# Patient Record
Sex: MALE | URBAN - METROPOLITAN AREA
[De-identification: names, ages, dates, MRNs, and addresses within clinical notes are randomized per-mention and may not be internally consistent; named-entity substitution may affect disease eponyms.]

---

## 2022-11-17 ENCOUNTER — OFFICE (OUTPATIENT)
Dept: URBAN - METROPOLITAN AREA CLINIC 76 | Facility: CLINIC | Age: 47
Setting detail: OPHTHALMOLOGY
End: 2022-11-17
Payer: COMMERCIAL

## 2022-11-17 DIAGNOSIS — H01.005: ICD-10-CM

## 2022-11-17 DIAGNOSIS — H01.001: ICD-10-CM

## 2022-11-17 DIAGNOSIS — H01.004: ICD-10-CM

## 2022-11-17 DIAGNOSIS — H01.002: ICD-10-CM

## 2022-11-17 DIAGNOSIS — H52.4: ICD-10-CM

## 2022-11-17 PROBLEM — H11.153 PINGUECULA; BOTH EYES: Status: ACTIVE | Noted: 2022-11-17

## 2022-11-17 PROCEDURE — 92004 COMPRE OPH EXAM NEW PT 1/>: CPT | Performed by: OPHTHALMOLOGY

## 2022-11-17 PROCEDURE — 92015 DETERMINE REFRACTIVE STATE: CPT | Performed by: OPHTHALMOLOGY

## 2022-11-17 ASSESSMENT — CONFRONTATIONAL VISUAL FIELD TEST (CVF)
OS_FINDINGS: FULL
OD_FINDINGS: FULL

## 2022-11-17 ASSESSMENT — VISUAL ACUITY
OD_BCVA: 20/20
OS_BCVA: 20/20

## 2022-11-17 ASSESSMENT — KERATOMETRY
OD_AXISANGLE_DEGREES: 085
OD_K2POWER_DIOPTERS: 43.00
OS_K1POWER_DIOPTERS: 42.25
OS_AXISANGLE_DEGREES: 078
OD_K1POWER_DIOPTERS: 42.50
OS_K2POWER_DIOPTERS: 42.75

## 2022-11-17 ASSESSMENT — LID EXAM ASSESSMENTS
OD_BLEPHARITIS: RLL RUL 1+
OS_BLEPHARITIS: LLL LUL 1+

## 2023-03-21 ASSESSMENT — REFRACTION_AUTOREFRACTION
OS_AXIS: 044
OD_CYLINDER: -0.25
OD_SPHERE: +0.50
OS_CYLINDER: -0.50
OS_SPHERE: +0.50
OD_AXIS: 022

## 2023-03-21 ASSESSMENT — REFRACTION_MANIFEST
OS_CYLINDER: SPH
OD_SPHERE: PLANO
OD_VA2: 20/20(J1+)
OS_VA2: 20/20(J1+)
OD_VA1: 20/20
OD_CYLINDER: SPH
OS_VA1: 20/20
OS_ADD: +1.75
OS_SPHERE: PLANO
OD_ADD: +1.75

## 2023-03-21 ASSESSMENT — KERATOMETRY
OS_K2POWER_DIOPTERS: 42.75
OD_K1POWER_DIOPTERS: 42.50
OD_AXISANGLE_DEGREES: 085
OD_K2POWER_DIOPTERS: 43.00
OS_AXISANGLE_DEGREES: 078
OS_K1POWER_DIOPTERS: 42.25

## 2023-03-21 ASSESSMENT — AXIALLENGTH_DERIVED
OS_AL: 23.8649
OD_AL: 23.722

## 2023-03-21 ASSESSMENT — SPHEQUIV_DERIVED
OS_SPHEQUIV: 0.25
OD_SPHEQUIV: 0.375

## 2023-05-17 ENCOUNTER — EMERGENCY (EMERGENCY)
Facility: HOSPITAL | Age: 48
LOS: 0 days | Discharge: HOME | End: 2023-05-18
Attending: EMERGENCY MEDICINE
Payer: MEDICAID

## 2023-05-17 VITALS
OXYGEN SATURATION: 99 % | DIASTOLIC BLOOD PRESSURE: 75 MMHG | HEART RATE: 75 BPM | RESPIRATION RATE: 18 BRPM | SYSTOLIC BLOOD PRESSURE: 120 MMHG | TEMPERATURE: 98 F

## 2023-05-17 DIAGNOSIS — T21.21XA BURN OF SECOND DEGREE OF CHEST WALL, INITIAL ENCOUNTER: ICD-10-CM
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
## 2023-05-17 DIAGNOSIS — T23.201A BURN OF SECOND DEGREE OF RIGHT HAND, UNSPECIFIED SITE, INITIAL ENCOUNTER: ICD-10-CM

## 2023-05-17 DIAGNOSIS — Z23 ENCOUNTER FOR IMMUNIZATION: ICD-10-CM

## 2023-05-17 DIAGNOSIS — T31.0 BURNS INVOLVING LESS THAN 10% OF BODY SURFACE: ICD-10-CM
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
## 2023-05-17 DIAGNOSIS — F10.90 ALCOHOL USE, UNSPECIFIED, UNCOMPLICATED: ICD-10-CM

## 2023-05-17 PROCEDURE — 99284 EMERGENCY DEPT VISIT MOD MDM: CPT

## 2023-05-17 PROCEDURE — 90471 IMMUNIZATION ADMIN: CPT

## 2023-05-17 PROCEDURE — 82962 GLUCOSE BLOOD TEST: CPT

## 2023-05-17 PROCEDURE — 90715 TDAP VACCINE 7 YRS/> IM: CPT

## 2023-05-17 PROCEDURE — 99285 EMERGENCY DEPT VISIT HI MDM: CPT | Mod: 25
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
## 2023-05-18 RX ORDER — TETANUS TOXOID, REDUCED DIPHTHERIA TOXOID AND ACELLULAR PERTUSSIS VACCINE, ADSORBED 5; 2.5; 8; 8; 2.5 [IU]/.5ML; [IU]/.5ML; UG/.5ML; UG/.5ML; UG/.5ML
0.5 SUSPENSION INTRAMUSCULAR ONCE
Refills: 0 | Status: COMPLETED | OUTPATIENT
Start: 2023-05-18 | End: 2023-05-18

## 2023-05-18 RX ADMIN — Medication 1 APPLICATION(S): at 04:40

## 2023-05-18 RX ADMIN — TETANUS TOXOID, REDUCED DIPHTHERIA TOXOID AND ACELLULAR PERTUSSIS VACCINE, ADSORBED 0.5 MILLILITER(S): 5; 2.5; 8; 8; 2.5 SUSPENSION INTRAMUSCULAR at 03:52
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
## 2023-05-18 NOTE — ED PROVIDER NOTE - PATIENT PORTAL LINK FT
You can access the FollowMyHealth Patient Portal offered by [facility] by registering at the following website: [email or web address]. By joining [facility]’s FollowMyHealth portal, you will also be able to view your health information using other applications (apps) compatible with our system.
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
## 2023-05-18 NOTE — ED ADULT NURSE NOTE - NSFALLUNIVINTERV_ED_ALL_ED
Bed/Stretcher in lowest position, wheels locked, appropriate side rails in place/Call bell, personal items and telephone in reach/Instruct patient to call for assistance before getting out of bed/chair/stretcher/Non-slip footwear applied when patient is off stretcher/[place] to call system/Physically safe environment - no spills, clutter or unnecessary equipment/Purposeful proactive rounding/Room/bathroom lighting operational, light cord in reach
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
## 2023-05-18 NOTE — ED PROVIDER NOTE - NSFOLLOWUPCLINICS_GEN_ALL_ED_FT
[facility]  Burn  [address] [place] [address]  Phone: [phone]  Fax:   Follow Up Time: 1-3 Days

## 2025-05-01 ENCOUNTER — OFFICE (OUTPATIENT)
Dept: URBAN - METROPOLITAN AREA CLINIC 76 | Facility: CLINIC | Age: 50
Setting detail: OPHTHALMOLOGY
End: 2025-05-01
Payer: COMMERCIAL

## 2025-05-01 DIAGNOSIS — H01.005: ICD-10-CM

## 2025-05-01 DIAGNOSIS — H11.153: ICD-10-CM

## 2025-05-01 DIAGNOSIS — H52.4: ICD-10-CM

## 2025-05-01 DIAGNOSIS — H01.002: ICD-10-CM

## 2025-05-01 DIAGNOSIS — H01.001: ICD-10-CM

## 2025-05-01 DIAGNOSIS — H01.004: ICD-10-CM

## 2025-05-01 PROCEDURE — 92014 COMPRE OPH EXAM EST PT 1/>: CPT | Performed by: OPTOMETRIST

## 2025-05-01 PROCEDURE — 92015 DETERMINE REFRACTIVE STATE: CPT | Performed by: OPTOMETRIST

## 2025-05-01 ASSESSMENT — KERATOMETRY
OS_AXISANGLE_DEGREES: 021
OS_K2POWER_DIOPTERS: 42.50
OD_AXISANGLE_DEGREES: 091
OS_K1POWER_DIOPTERS: 42.25
OD_K2POWER_DIOPTERS: 43.25
OD_K1POWER_DIOPTERS: 42.50

## 2025-05-01 ASSESSMENT — CONFRONTATIONAL VISUAL FIELD TEST (CVF)
OS_FINDINGS: FULL
OD_FINDINGS: FULL

## 2025-05-01 ASSESSMENT — LID EXAM ASSESSMENTS
OS_BLEPHARITIS: LLL LUL 1+
OD_BLEPHARITIS: RLL RUL 1+

## 2025-05-01 ASSESSMENT — REFRACTION_AUTOREFRACTION
OS_CYLINDER: -0.50
OS_SPHERE: +1.00
OD_AXIS: 043
OD_SPHERE: +0.75
OD_CYLINDER: -0.25
OS_AXIS: 071

## 2025-05-01 ASSESSMENT — VISUAL ACUITY
OS_BCVA: 20/40
OD_BCVA: 20/40

## 2025-05-01 ASSESSMENT — REFRACTION_MANIFEST
OD_AXIS: 045
OD_CYLINDER: SPH
OS_AXIS: 075
OD_ADD: +1.75
OS_ADD: +1.75
OD_VA1: 20/20
OD_ADD: +1.75
OD_SPHERE: +0.75
OS_SPHERE: PLANO
OS_CYLINDER: SPH
OD_SPHERE: PLANO
OS_VA2: 20/20(J1+)
OS_CYLINDER: -0.50
OD_VA2: 20/20(J1+)
OS_VA1: 20/20
OS_ADD: +1.75
OS_VA1: 20/20
OD_CYLINDER: -0.25
OD_VA1: 20/20
OS_SPHERE: +1.00

## 2025-05-16 ENCOUNTER — OFFICE (OUTPATIENT)
Dept: URBAN - METROPOLITAN AREA CLINIC 76 | Facility: CLINIC | Age: 50
Setting detail: OPHTHALMOLOGY
End: 2025-05-16
Payer: COMMERCIAL

## 2025-05-16 DIAGNOSIS — H52.4: ICD-10-CM

## 2025-05-16 PROCEDURE — RX/CHECK RX/CHECK: Performed by: OPTOMETRIST

## 2025-05-16 ASSESSMENT — REFRACTION_AUTOREFRACTION
OS_AXIS: 070
OS_CYLINDER: -0.50
OD_CYLINDER: -0.25
OD_AXIS: 025
OS_SPHERE: +1.25
OD_SPHERE: +1.25

## 2025-05-16 ASSESSMENT — REFRACTION_MANIFEST
OD_VA1: 20/20
OD_ADD: +1.75
OS_VA1: 20/20
OD_CYLINDER: -0.25
OD_SPHERE: +0.75
OD_CYLINDER: SPH
OS_SPHERE: +1.00
OS_CYLINDER: SPH
OS_SPHERE: PLANO
OS_CYLINDER: -0.50
OS_VA2: 20/20(J1+)
OD_ADD: +1.75
OS_AXIS: 070
OD_ADD: +1.50
OS_SPHERE: +1.00
OD_VA1: 20/20
OS_AXIS: 075
OD_VA2: 20/20(J1+)
OD_AXIS: 035
OD_VA1: 20/20
OS_ADD: +1.75
OS_ADD: +1.75
OS_VA1: 20/20
OD_CYLINDER: -0.25
OD_SPHERE: PLANO
OS_CYLINDER: -0.50
OD_SPHERE: +0.75
OD_AXIS: 045
OS_ADD: +1.50
OS_VA1: 20/20-1

## 2025-05-16 ASSESSMENT — REFRACTION_CURRENTRX
OD_SPHERE: +0.75
OS_ADD: +1.75
OD_OVR_VA: 20/
OD_CYLINDER: -0.25
OS_AXIS: 075
OD_AXIS: 045
OS_SPHERE: +1.00
OS_OVR_VA: 20/
OS_CYLINDER: -0.50
OD_ADD: +1.75

## 2025-05-16 ASSESSMENT — LID EXAM ASSESSMENTS
OS_BLEPHARITIS: LLL LUL 1+
OD_BLEPHARITIS: RLL RUL 1+

## 2025-05-16 ASSESSMENT — VISUAL ACUITY
OS_BCVA: 20/25-2
OD_BCVA: 20/20

## 2025-05-16 ASSESSMENT — KERATOMETRY
OD_AXISANGLE_DEGREES: 088
OS_K1POWER_DIOPTERS: 42.25
OD_K2POWER_DIOPTERS: 43.25
OS_AXISANGLE_DEGREES: 005
OD_K1POWER_DIOPTERS: 42.50
OS_K2POWER_DIOPTERS: 42.50

## 2025-05-16 ASSESSMENT — CONFRONTATIONAL VISUAL FIELD TEST (CVF)
OS_FINDINGS: FULL
OD_FINDINGS: FULL